# Patient Record
Sex: FEMALE | Race: WHITE
[De-identification: names, ages, dates, MRNs, and addresses within clinical notes are randomized per-mention and may not be internally consistent; named-entity substitution may affect disease eponyms.]

---

## 2018-06-05 ENCOUNTER — HOSPITAL ENCOUNTER (EMERGENCY)
Dept: HOSPITAL 84 - D.ER | Age: 53
Discharge: HOME | End: 2018-06-05
Payer: MEDICARE

## 2018-06-05 VITALS — DIASTOLIC BLOOD PRESSURE: 54 MMHG | SYSTOLIC BLOOD PRESSURE: 114 MMHG

## 2018-06-05 VITALS — WEIGHT: 230.48 LBS | HEIGHT: 60 IN | BODY MASS INDEX: 45.25 KG/M2

## 2018-06-05 DIAGNOSIS — J44.9: ICD-10-CM

## 2018-06-05 DIAGNOSIS — M25.50: ICD-10-CM

## 2018-06-05 DIAGNOSIS — F17.200: ICD-10-CM

## 2018-06-05 DIAGNOSIS — G89.4: Primary | ICD-10-CM

## 2018-07-25 ENCOUNTER — HOSPITAL ENCOUNTER (EMERGENCY)
Dept: HOSPITAL 84 - D.ER | Age: 53
Discharge: HOME | End: 2018-07-25
Payer: MEDICARE

## 2018-07-25 VITALS
WEIGHT: 220.46 LBS | HEIGHT: 60 IN | HEIGHT: 60 IN | BODY MASS INDEX: 43.28 KG/M2 | BODY MASS INDEX: 43.28 KG/M2 | WEIGHT: 220.46 LBS | WEIGHT: 220.46 LBS | BODY MASS INDEX: 43.28 KG/M2 | HEIGHT: 60 IN

## 2018-07-25 VITALS — SYSTOLIC BLOOD PRESSURE: 111 MMHG | DIASTOLIC BLOOD PRESSURE: 56 MMHG

## 2018-07-25 DIAGNOSIS — M79.1: Primary | ICD-10-CM

## 2018-07-25 DIAGNOSIS — F17.200: ICD-10-CM

## 2018-07-25 DIAGNOSIS — J44.9: ICD-10-CM

## 2018-07-25 DIAGNOSIS — Z86.59: ICD-10-CM

## 2018-09-09 ENCOUNTER — HOSPITAL ENCOUNTER (INPATIENT)
Dept: HOSPITAL 84 - D.ER | Age: 53
LOS: 2 days | Discharge: ANOTHER HOSPITAL | DRG: 918 | End: 2018-09-11
Attending: FAMILY MEDICINE | Admitting: FAMILY MEDICINE
Payer: MEDICARE

## 2018-09-09 VITALS — DIASTOLIC BLOOD PRESSURE: 76 MMHG | SYSTOLIC BLOOD PRESSURE: 125 MMHG

## 2018-09-09 VITALS — DIASTOLIC BLOOD PRESSURE: 68 MMHG | SYSTOLIC BLOOD PRESSURE: 132 MMHG

## 2018-09-09 VITALS
HEIGHT: 60 IN | WEIGHT: 222 LBS | HEIGHT: 60 IN | BODY MASS INDEX: 43.59 KG/M2 | WEIGHT: 222 LBS | BODY MASS INDEX: 43.59 KG/M2 | BODY MASS INDEX: 43.59 KG/M2 | BODY MASS INDEX: 43.59 KG/M2

## 2018-09-09 VITALS — SYSTOLIC BLOOD PRESSURE: 133 MMHG | DIASTOLIC BLOOD PRESSURE: 69 MMHG

## 2018-09-09 VITALS — DIASTOLIC BLOOD PRESSURE: 61 MMHG | SYSTOLIC BLOOD PRESSURE: 129 MMHG

## 2018-09-09 VITALS — DIASTOLIC BLOOD PRESSURE: 81 MMHG | SYSTOLIC BLOOD PRESSURE: 126 MMHG

## 2018-09-09 VITALS — SYSTOLIC BLOOD PRESSURE: 111 MMHG | DIASTOLIC BLOOD PRESSURE: 69 MMHG

## 2018-09-09 VITALS — SYSTOLIC BLOOD PRESSURE: 136 MMHG | DIASTOLIC BLOOD PRESSURE: 103 MMHG

## 2018-09-09 VITALS — DIASTOLIC BLOOD PRESSURE: 100 MMHG | SYSTOLIC BLOOD PRESSURE: 146 MMHG

## 2018-09-09 VITALS — DIASTOLIC BLOOD PRESSURE: 85 MMHG | SYSTOLIC BLOOD PRESSURE: 127 MMHG

## 2018-09-09 DIAGNOSIS — T43.212A: Primary | ICD-10-CM

## 2018-09-09 DIAGNOSIS — F15.10: ICD-10-CM

## 2018-09-09 DIAGNOSIS — F31.9: ICD-10-CM

## 2018-09-09 DIAGNOSIS — F17.203: ICD-10-CM

## 2018-09-09 DIAGNOSIS — F12.10: ICD-10-CM

## 2018-09-09 DIAGNOSIS — E66.01: ICD-10-CM

## 2018-09-09 DIAGNOSIS — J44.9: ICD-10-CM

## 2018-09-09 LAB
ALBUMIN SERPL-MCNC: 2.5 G/DL (ref 3.4–5)
ALP SERPL-CCNC: 130 U/L (ref 46–116)
ALT SERPL-CCNC: 14 U/L (ref 10–68)
AMPHETAMINES UR QL SCN: POSITIVE QUAL
ANION GAP SERPL CALC-SCNC: 15.3 MMOL/L (ref 8–16)
APAP SERPL-MCNC: 2.9 UG/ML (ref 10–30)
APPEARANCE UR: CLEAR
BARBITURATES UR QL SCN: NEGATIVE QUAL
BASOPHILS NFR BLD AUTO: 0.1 % (ref 0–2)
BENZODIAZ UR QL SCN: NEGATIVE QUAL
BILIRUB SERPL-MCNC: 0.29 MG/DL (ref 0.2–1.3)
BILIRUB SERPL-MCNC: NEGATIVE MG/DL
BUN SERPL-MCNC: 9 MG/DL (ref 7–18)
BZE UR QL SCN: NEGATIVE QUAL
CALCIUM SERPL-MCNC: 8.7 MG/DL (ref 8.5–10.1)
CANNABINOIDS UR QL SCN: POSITIVE QUAL
CHLORIDE SERPL-SCNC: 98 MMOL/L (ref 98–107)
CO2 SERPL-SCNC: 28 MMOL/L (ref 21–32)
COLOR UR: YELLOW
CREAT SERPL-MCNC: 1.1 MG/DL (ref 0.6–1.3)
EOSINOPHIL NFR BLD: 0.5 % (ref 0–7)
ERYTHROCYTE [DISTWIDTH] IN BLOOD BY AUTOMATED COUNT: 16 % (ref 11.5–14.5)
ETHANOL SERPL-MCNC: 3 MG/DL (ref 0–10)
GLOBULIN SER-MCNC: 4.6 G/L
GLUCOSE SERPL-MCNC: 120 MG/DL (ref 74–106)
GLUCOSE SERPL-MCNC: NEGATIVE MG/DL
HCT VFR BLD CALC: 45.3 % (ref 36–48)
HGB BLD-MCNC: 14.8 G/DL (ref 12–16)
IMM GRANULOCYTES NFR BLD: 0.3 % (ref 0–5)
KETONES UR STRIP-MCNC: NEGATIVE MG/DL
LYMPHOCYTES NFR BLD AUTO: 26 % (ref 15–50)
MCH RBC QN AUTO: 29 PG (ref 26–34)
MCHC RBC AUTO-ENTMCNC: 32.7 G/DL (ref 31–37)
MCV RBC: 88.6 FL (ref 80–100)
MONOCYTES NFR BLD: 7 % (ref 2–11)
NEUTROPHILS NFR BLD AUTO: 66.1 % (ref 40–80)
NITRITE UR-MCNC: NEGATIVE MG/ML
OPIATES UR QL SCN: POSITIVE QUAL
OSMOLALITY SERPL CALC.SUM OF ELEC: 275 MOSM/KG (ref 275–300)
PCP UR QL SCN: NEGATIVE QUAL
PH UR STRIP: 6 [PH] (ref 5–6)
PLATELET # BLD: 446 10X3/UL (ref 130–400)
PMV BLD AUTO: 9.5 FL (ref 7.4–10.4)
POTASSIUM SERPL-SCNC: 3.3 MMOL/L (ref 3.5–5.1)
PROT SERPL-MCNC: 7.1 G/DL (ref 6.4–8.2)
PROT UR-MCNC: NEGATIVE MG/DL
RBC # BLD AUTO: 5.11 10X6/UL (ref 4–5.4)
SODIUM SERPL-SCNC: 138 MMOL/L (ref 136–145)
SP GR UR STRIP: 1.01 (ref 1–1.02)
UROBILINOGEN UR-MCNC: NORMAL MG/DL
WBC # BLD AUTO: 14.6 10X3/UL (ref 4.8–10.8)

## 2018-09-10 VITALS — DIASTOLIC BLOOD PRESSURE: 89 MMHG | SYSTOLIC BLOOD PRESSURE: 123 MMHG

## 2018-09-10 VITALS — DIASTOLIC BLOOD PRESSURE: 84 MMHG | SYSTOLIC BLOOD PRESSURE: 126 MMHG

## 2018-09-10 VITALS — DIASTOLIC BLOOD PRESSURE: 89 MMHG | SYSTOLIC BLOOD PRESSURE: 112 MMHG

## 2018-09-10 VITALS — DIASTOLIC BLOOD PRESSURE: 78 MMHG | SYSTOLIC BLOOD PRESSURE: 108 MMHG

## 2018-09-10 VITALS — DIASTOLIC BLOOD PRESSURE: 86 MMHG | SYSTOLIC BLOOD PRESSURE: 122 MMHG

## 2018-09-10 VITALS — DIASTOLIC BLOOD PRESSURE: 71 MMHG | SYSTOLIC BLOOD PRESSURE: 124 MMHG

## 2018-09-10 VITALS — DIASTOLIC BLOOD PRESSURE: 79 MMHG | SYSTOLIC BLOOD PRESSURE: 119 MMHG

## 2018-09-10 VITALS — DIASTOLIC BLOOD PRESSURE: 76 MMHG | SYSTOLIC BLOOD PRESSURE: 112 MMHG

## 2018-09-10 VITALS — SYSTOLIC BLOOD PRESSURE: 123 MMHG | DIASTOLIC BLOOD PRESSURE: 87 MMHG

## 2018-09-10 VITALS — DIASTOLIC BLOOD PRESSURE: 80 MMHG | SYSTOLIC BLOOD PRESSURE: 119 MMHG

## 2018-09-10 VITALS — DIASTOLIC BLOOD PRESSURE: 85 MMHG | SYSTOLIC BLOOD PRESSURE: 120 MMHG

## 2018-09-10 VITALS — SYSTOLIC BLOOD PRESSURE: 109 MMHG | DIASTOLIC BLOOD PRESSURE: 72 MMHG

## 2018-09-10 VITALS — SYSTOLIC BLOOD PRESSURE: 115 MMHG | DIASTOLIC BLOOD PRESSURE: 76 MMHG

## 2018-09-10 VITALS — DIASTOLIC BLOOD PRESSURE: 90 MMHG | SYSTOLIC BLOOD PRESSURE: 99 MMHG

## 2018-09-10 VITALS — SYSTOLIC BLOOD PRESSURE: 107 MMHG | DIASTOLIC BLOOD PRESSURE: 80 MMHG

## 2018-09-10 VITALS — SYSTOLIC BLOOD PRESSURE: 101 MMHG | DIASTOLIC BLOOD PRESSURE: 75 MMHG

## 2018-09-10 VITALS — DIASTOLIC BLOOD PRESSURE: 79 MMHG | SYSTOLIC BLOOD PRESSURE: 112 MMHG

## 2018-09-10 VITALS — DIASTOLIC BLOOD PRESSURE: 73 MMHG | SYSTOLIC BLOOD PRESSURE: 105 MMHG

## 2018-09-10 VITALS — SYSTOLIC BLOOD PRESSURE: 111 MMHG | DIASTOLIC BLOOD PRESSURE: 75 MMHG

## 2018-09-10 LAB
ALBUMIN SERPL-MCNC: 2.1 G/DL (ref 3.4–5)
ALP SERPL-CCNC: 122 U/L (ref 46–116)
ALT SERPL-CCNC: 13 U/L (ref 10–68)
ANION GAP SERPL CALC-SCNC: 6.6 MMOL/L (ref 8–16)
BASOPHILS NFR BLD AUTO: 0.1 % (ref 0–2)
BILIRUB SERPL-MCNC: 0.23 MG/DL (ref 0.2–1.3)
BUN SERPL-MCNC: 10 MG/DL (ref 7–18)
CALCIUM SERPL-MCNC: 8 MG/DL (ref 8.5–10.1)
CHLORIDE SERPL-SCNC: 101 MMOL/L (ref 98–107)
CO2 SERPL-SCNC: 32.6 MMOL/L (ref 21–32)
CREAT SERPL-MCNC: 0.9 MG/DL (ref 0.6–1.3)
EOSINOPHIL NFR BLD: 0.5 % (ref 0–7)
ERYTHROCYTE [DISTWIDTH] IN BLOOD BY AUTOMATED COUNT: 15.8 % (ref 11.5–14.5)
GLOBULIN SER-MCNC: 4 G/L
GLUCOSE SERPL-MCNC: 97 MG/DL (ref 74–106)
HCT VFR BLD CALC: 42.9 % (ref 36–48)
HGB BLD-MCNC: 13.7 G/DL (ref 12–16)
IMM GRANULOCYTES NFR BLD: 0.3 % (ref 0–5)
LYMPHOCYTES NFR BLD AUTO: 21.6 % (ref 15–50)
MCH RBC QN AUTO: 28.4 PG (ref 26–34)
MCHC RBC AUTO-ENTMCNC: 31.9 G/DL (ref 31–37)
MCV RBC: 89 FL (ref 80–100)
MONOCYTES NFR BLD: 5 % (ref 2–11)
NEUTROPHILS NFR BLD AUTO: 72.5 % (ref 40–80)
OSMOLALITY SERPL CALC.SUM OF ELEC: 272 MOSM/KG (ref 275–300)
PLATELET # BLD: 367 10X3/UL (ref 130–400)
PMV BLD AUTO: 9.4 FL (ref 7.4–10.4)
POTASSIUM SERPL-SCNC: 3.2 MMOL/L (ref 3.5–5.1)
PROT SERPL-MCNC: 6.1 G/DL (ref 6.4–8.2)
RBC # BLD AUTO: 4.82 10X6/UL (ref 4–5.4)
SODIUM SERPL-SCNC: 137 MMOL/L (ref 136–145)
WBC # BLD AUTO: 13.1 10X3/UL (ref 4.8–10.8)

## 2019-10-18 ENCOUNTER — HOSPITAL ENCOUNTER (INPATIENT)
Dept: HOSPITAL 84 - D.ER | Age: 54
LOS: 6 days | Discharge: HOME | DRG: 208 | End: 2019-10-24
Attending: FAMILY MEDICINE | Admitting: FAMILY MEDICINE
Payer: MEDICARE

## 2019-10-18 VITALS
BODY MASS INDEX: 51.15 KG/M2 | BODY MASS INDEX: 51.15 KG/M2 | HEIGHT: 60 IN | HEIGHT: 60 IN | BODY MASS INDEX: 51.15 KG/M2 | BODY MASS INDEX: 51.15 KG/M2 | WEIGHT: 260.55 LBS | WEIGHT: 260.55 LBS

## 2019-10-18 VITALS — SYSTOLIC BLOOD PRESSURE: 114 MMHG | DIASTOLIC BLOOD PRESSURE: 54 MMHG

## 2019-10-18 VITALS — SYSTOLIC BLOOD PRESSURE: 125 MMHG | DIASTOLIC BLOOD PRESSURE: 56 MMHG

## 2019-10-18 VITALS — SYSTOLIC BLOOD PRESSURE: 112 MMHG | DIASTOLIC BLOOD PRESSURE: 64 MMHG

## 2019-10-18 VITALS — DIASTOLIC BLOOD PRESSURE: 73 MMHG | SYSTOLIC BLOOD PRESSURE: 139 MMHG

## 2019-10-18 VITALS — SYSTOLIC BLOOD PRESSURE: 129 MMHG | DIASTOLIC BLOOD PRESSURE: 80 MMHG

## 2019-10-18 VITALS — SYSTOLIC BLOOD PRESSURE: 141 MMHG | DIASTOLIC BLOOD PRESSURE: 81 MMHG

## 2019-10-18 DIAGNOSIS — F17.203: ICD-10-CM

## 2019-10-18 DIAGNOSIS — E66.2: ICD-10-CM

## 2019-10-18 DIAGNOSIS — J44.9: ICD-10-CM

## 2019-10-18 DIAGNOSIS — J69.0: Primary | ICD-10-CM

## 2019-10-18 DIAGNOSIS — D53.9: ICD-10-CM

## 2019-10-18 DIAGNOSIS — J96.22: ICD-10-CM

## 2019-10-18 DIAGNOSIS — B37.3: ICD-10-CM

## 2019-10-18 DIAGNOSIS — E87.1: ICD-10-CM

## 2019-10-18 DIAGNOSIS — J96.21: ICD-10-CM

## 2019-10-18 DIAGNOSIS — F41.9: ICD-10-CM

## 2019-10-18 DIAGNOSIS — G93.41: ICD-10-CM

## 2019-10-18 DIAGNOSIS — E87.2: ICD-10-CM

## 2019-10-18 LAB
ALBUMIN SERPL-MCNC: 3.1 G/DL (ref 3.4–5)
ALP SERPL-CCNC: 124 U/L (ref 46–116)
ALT SERPL-CCNC: 24 U/L (ref 10–68)
AMPHETAMINES UR QL SCN: NEGATIVE QUAL
ANION GAP SERPL CALC-SCNC: 2.1 MMOL/L (ref 8–16)
APPEARANCE UR: CLEAR
APTT BLD: 28.6 SECONDS (ref 22.8–39.4)
BARBITURATES UR QL SCN: NEGATIVE QUAL
BASOPHILS NFR BLD AUTO: 0.2 % (ref 0–2)
BENZODIAZ UR QL SCN: NEGATIVE QUAL
BILIRUB SERPL-MCNC: 0.17 MG/DL (ref 0.2–1.3)
BILIRUB SERPL-MCNC: NEGATIVE MG/DL
BUN SERPL-MCNC: 11 MG/DL (ref 7–18)
BZE UR QL SCN: NEGATIVE QUAL
CALCIUM SERPL-MCNC: 8.7 MG/DL (ref 8.5–10.1)
CANNABINOIDS UR QL SCN: NEGATIVE QUAL
CHLORIDE SERPL-SCNC: 95 MMOL/L (ref 98–107)
CK MB SERPL-MCNC: 1.9 U/L (ref 0–3.6)
CK SERPL-CCNC: 27 UL (ref 21–215)
CO2 SERPL-SCNC: 41.2 MMOL/L (ref 21–32)
COLOR UR: YELLOW
CREAT SERPL-MCNC: 0.9 MG/DL (ref 0.6–1.3)
EOSINOPHIL NFR BLD: 0.3 % (ref 0–7)
ERYTHROCYTE [DISTWIDTH] IN BLOOD BY AUTOMATED COUNT: 14.7 % (ref 11.5–14.5)
GLOBULIN SER-MCNC: 3.7 G/L
GLUCOSE SERPL-MCNC: 150 MG/DL (ref 74–106)
GLUCOSE SERPL-MCNC: NEGATIVE MG/DL
HCT VFR BLD CALC: 36.9 % (ref 36–48)
HGB BLD-MCNC: 10.6 G/DL (ref 12–16)
IMM GRANULOCYTES NFR BLD: 0.7 % (ref 0–5)
INR PPP: 0.95 (ref 0.85–1.17)
KETONES UR STRIP-MCNC: NEGATIVE MG/DL
LYMPHOCYTES NFR BLD AUTO: 24.1 % (ref 15–50)
MAGNESIUM SERPL-MCNC: 1.8 MG/DL (ref 1.8–2.4)
MCH RBC QN AUTO: 30 PG (ref 26–34)
MCHC RBC AUTO-ENTMCNC: 28.7 G/DL (ref 31–37)
MCV RBC: 104.5 FL (ref 80–100)
MONOCYTES NFR BLD: 8.4 % (ref 2–11)
NEUTROPHILS NFR BLD AUTO: 66.3 % (ref 40–80)
NITRITE UR-MCNC: NEGATIVE MG/ML
OPIATES UR QL SCN: POSITIVE QUAL
OSMOLALITY SERPL CALC.SUM OF ELEC: 267 MOSM/KG (ref 275–300)
PCP UR QL SCN: NEGATIVE QUAL
PH UR STRIP: 5 [PH] (ref 5–6)
PLATELET # BLD: 272 10X3/UL (ref 130–400)
PMV BLD AUTO: 10.5 FL (ref 7.4–10.4)
POTASSIUM SERPL-SCNC: 5.3 MMOL/L (ref 3.5–5.1)
PROT SERPL-MCNC: 6.8 G/DL (ref 6.4–8.2)
PROT UR-MCNC: NEGATIVE MG/DL
PROTHROMBIN TIME: 12.2 SECONDS (ref 11.6–15)
RBC # BLD AUTO: 3.53 10X6/UL (ref 4–5.4)
SODIUM SERPL-SCNC: 133 MMOL/L (ref 136–145)
SP GR UR STRIP: 1.01 (ref 1–1.02)
TROPONIN I SERPL-MCNC: 0.02 NG/ML (ref 0–0.06)
TSH SERPL-ACNC: 1.38 UIU/ML (ref 0.36–3.74)
UROBILINOGEN UR-MCNC: NORMAL MG/DL
WBC # BLD AUTO: 11.4 10X3/UL (ref 4.8–10.8)

## 2019-10-18 NOTE — NUR
PT ARRIVED ON UNIT VIA STRETCHER ACCOMPANIED BY ER STAFF, NO O2 ON PATIENT
THIS TIME, RESPIRATORY AT BEDSIDE, PATIENT TRANSFERRED AND BIPAP PLACED.
PATIENT AWAKE, CONFUSED ATTEMPTING TO PULL BIPAP OFF, WANTS TO GO HOME,
ATTEMPTS TO REORIENT UNSUCCESSFUL. VSS CPOC

## 2019-10-18 NOTE — NUR
PT PULLING BIPAP AND ICU MONITORING OFF AT THIS TIME, CONFUSED, UNABLE TO
REORIENT, UNDERSTANDS SHE IS AT THE HOSPTIAL BUT SHE WANTS TO GO HOME AND SHE
CONTINUES TO PULL EVERYTHING OFF. SOFT BILAT WRIST RESTRAINTS APPLIED AT THIS
TIME FOR MEDICAL SAFETY.

## 2019-10-18 NOTE — NUR
SHIFT ASSESSMENT COMPLETED, BIPAP ALARMING, PATIENT LETHARGIC RESPONDS TO
VOICE, UNABLE TO ANSWER QUESTIONS, POOR HISTORIAN, WAKES UP AND IMMEDIATELY
GOES BACK TO SLEEP.

## 2019-10-18 NOTE — NUR
CALL LIGHT ANSWERED BY THIS NURSE. MISTY ESTRADA AT BEDSIDE, VERBAL ORDERS FOR
ABG'S AND BI-PAP RECEIVED AT THIS TIME. RESPIRATORY CALLED.

## 2019-10-18 NOTE — NUR
WENT INTO ROOM AND PT TRYING TO OPEN A BOTTLE OF PRESCRIPTION MED TO TAKE.
REFUSED TO GIVE UP MEDICATION BOTTLE. THIS NURSE CALLED OTHER NURSES TO HELP.

## 2019-10-18 NOTE — NUR
REPORT RECEIVED FROM EMMY JOYCE. PT REQUESTS TO URINATE. BEDPAN PLACED UNDER PT,
CALL LIGHT LEFT FOR PT TO CALL WHEN FINISHED.

## 2019-10-19 VITALS — DIASTOLIC BLOOD PRESSURE: 65 MMHG | SYSTOLIC BLOOD PRESSURE: 128 MMHG

## 2019-10-19 VITALS — DIASTOLIC BLOOD PRESSURE: 66 MMHG | SYSTOLIC BLOOD PRESSURE: 125 MMHG

## 2019-10-19 VITALS — SYSTOLIC BLOOD PRESSURE: 125 MMHG | DIASTOLIC BLOOD PRESSURE: 69 MMHG

## 2019-10-19 VITALS — SYSTOLIC BLOOD PRESSURE: 128 MMHG | DIASTOLIC BLOOD PRESSURE: 71 MMHG

## 2019-10-19 VITALS — DIASTOLIC BLOOD PRESSURE: 77 MMHG | SYSTOLIC BLOOD PRESSURE: 129 MMHG

## 2019-10-19 VITALS — SYSTOLIC BLOOD PRESSURE: 98 MMHG | DIASTOLIC BLOOD PRESSURE: 45 MMHG

## 2019-10-19 VITALS — DIASTOLIC BLOOD PRESSURE: 71 MMHG | SYSTOLIC BLOOD PRESSURE: 112 MMHG

## 2019-10-19 VITALS — DIASTOLIC BLOOD PRESSURE: 50 MMHG | SYSTOLIC BLOOD PRESSURE: 99 MMHG

## 2019-10-19 VITALS — DIASTOLIC BLOOD PRESSURE: 86 MMHG | SYSTOLIC BLOOD PRESSURE: 142 MMHG

## 2019-10-19 VITALS — SYSTOLIC BLOOD PRESSURE: 127 MMHG | DIASTOLIC BLOOD PRESSURE: 77 MMHG

## 2019-10-19 VITALS — DIASTOLIC BLOOD PRESSURE: 68 MMHG | SYSTOLIC BLOOD PRESSURE: 130 MMHG

## 2019-10-19 VITALS — SYSTOLIC BLOOD PRESSURE: 95 MMHG | DIASTOLIC BLOOD PRESSURE: 49 MMHG

## 2019-10-19 VITALS — DIASTOLIC BLOOD PRESSURE: 64 MMHG | SYSTOLIC BLOOD PRESSURE: 119 MMHG

## 2019-10-19 VITALS — SYSTOLIC BLOOD PRESSURE: 119 MMHG | DIASTOLIC BLOOD PRESSURE: 71 MMHG

## 2019-10-19 VITALS — DIASTOLIC BLOOD PRESSURE: 53 MMHG | SYSTOLIC BLOOD PRESSURE: 99 MMHG

## 2019-10-19 VITALS — DIASTOLIC BLOOD PRESSURE: 62 MMHG | SYSTOLIC BLOOD PRESSURE: 111 MMHG

## 2019-10-19 VITALS — DIASTOLIC BLOOD PRESSURE: 70 MMHG | SYSTOLIC BLOOD PRESSURE: 119 MMHG

## 2019-10-19 VITALS — SYSTOLIC BLOOD PRESSURE: 99 MMHG | DIASTOLIC BLOOD PRESSURE: 46 MMHG

## 2019-10-19 VITALS — SYSTOLIC BLOOD PRESSURE: 129 MMHG | DIASTOLIC BLOOD PRESSURE: 97 MMHG

## 2019-10-19 VITALS — SYSTOLIC BLOOD PRESSURE: 89 MMHG | DIASTOLIC BLOOD PRESSURE: 42 MMHG

## 2019-10-19 VITALS — DIASTOLIC BLOOD PRESSURE: 53 MMHG | SYSTOLIC BLOOD PRESSURE: 104 MMHG

## 2019-10-19 VITALS — DIASTOLIC BLOOD PRESSURE: 66 MMHG | SYSTOLIC BLOOD PRESSURE: 119 MMHG

## 2019-10-19 VITALS — SYSTOLIC BLOOD PRESSURE: 122 MMHG | DIASTOLIC BLOOD PRESSURE: 66 MMHG

## 2019-10-19 VITALS — SYSTOLIC BLOOD PRESSURE: 101 MMHG | DIASTOLIC BLOOD PRESSURE: 53 MMHG

## 2019-10-19 VITALS — SYSTOLIC BLOOD PRESSURE: 132 MMHG | DIASTOLIC BLOOD PRESSURE: 76 MMHG

## 2019-10-19 VITALS — SYSTOLIC BLOOD PRESSURE: 115 MMHG | DIASTOLIC BLOOD PRESSURE: 72 MMHG

## 2019-10-19 LAB
ANION GAP SERPL CALC-SCNC: 3.2 MMOL/L (ref 8–16)
BASOPHILS NFR BLD AUTO: 0.1 % (ref 0–2)
BUN SERPL-MCNC: 12 MG/DL (ref 7–18)
CALCIUM SERPL-MCNC: 8.6 MG/DL (ref 8.5–10.1)
CHLORIDE SERPL-SCNC: 98 MMOL/L (ref 98–107)
CO2 SERPL-SCNC: 38 MMOL/L (ref 21–32)
CREAT SERPL-MCNC: 0.8 MG/DL (ref 0.6–1.3)
EOSINOPHIL NFR BLD: 0.4 % (ref 0–7)
ERYTHROCYTE [DISTWIDTH] IN BLOOD BY AUTOMATED COUNT: 14.8 % (ref 11.5–14.5)
GLUCOSE SERPL-MCNC: 112 MG/DL (ref 74–106)
HCT VFR BLD CALC: 33 % (ref 36–48)
HGB BLD-MCNC: 9.5 G/DL (ref 12–16)
IMM GRANULOCYTES NFR BLD: 0.3 % (ref 0–5)
LYMPHOCYTES NFR BLD AUTO: 33.2 % (ref 15–50)
MAGNESIUM SERPL-MCNC: 1.6 MG/DL (ref 1.8–2.4)
MCH RBC QN AUTO: 30 PG (ref 26–34)
MCHC RBC AUTO-ENTMCNC: 28.8 G/DL (ref 31–37)
MCV RBC: 104.1 FL (ref 80–100)
MONOCYTES NFR BLD: 10.9 % (ref 2–11)
NEUTROPHILS NFR BLD AUTO: 55.1 % (ref 40–80)
NT-PROBNP SERPL-MCNC: 1703 PG/ML (ref 0–125)
OSMOLALITY SERPL CALC.SUM OF ELEC: 270 MOSM/KG (ref 275–300)
PHOSPHATE SERPL-MCNC: 1.6 MG/DL (ref 2.5–4.9)
PLATELET # BLD: 220 10X3/UL (ref 130–400)
PMV BLD AUTO: 10.6 FL (ref 7.4–10.4)
POTASSIUM SERPL-SCNC: 4.2 MMOL/L (ref 3.5–5.1)
RBC # BLD AUTO: 3.17 10X6/UL (ref 4–5.4)
SODIUM SERPL-SCNC: 135 MMOL/L (ref 136–145)
WBC # BLD AUTO: 12.1 10X3/UL (ref 4.8–10.8)

## 2019-10-19 PROCEDURE — 5A1945Z RESPIRATORY VENTILATION, 24-96 CONSECUTIVE HOURS: ICD-10-PCS | Performed by: FAMILY MEDICINE

## 2019-10-19 PROCEDURE — 0BH17EZ INSERTION OF ENDOTRACHEAL AIRWAY INTO TRACHEA, VIA NATURAL OR ARTIFICIAL OPENING: ICD-10-PCS | Performed by: FAMILY MEDICINE

## 2019-10-19 NOTE — NUR
TUBE FEEDINGS STARTED AT THIS TIME PER PHYSICAIN ORDERS. PULMOCARE AT 25ML/HR.
NO ACUTE DISTRESS NOTED. VSS. WILL CONTINUE PLAN OF CARE.

## 2019-10-19 NOTE — NUR
NO ACUTE DISTRESS NOTED. NO CHANGE. VSS. TURNED Q2H. ORAL CARE PROVIDED Q2H.
WILL CONTINUE PLAN OF CARE.

## 2019-10-19 NOTE — NUR
UP IN BED VISITING WITH FAMILY AT THIS TIME. NO ACUTE DISTRESS NOTED. VSS.
WILL CONTINUE PLAN OF CARE.

## 2019-10-19 NOTE — NUR
PT REASSESSMENT COMPLETED SEE FLOWSHEET, PT INTUBATED AND SEDATED NO APPARENT
SIGNS OF DISTRESS AT THIS TIME VSS CPOC

## 2019-10-19 NOTE — NUR
TUBE FEEDING ORDERS RECIVED BY DR QUIÑONES, ORDER RECIEVED FOR OGT PLACEMENT. OGT
PLACED 16F, PLACEMENT VERIFIED VIA AUSCULTATION. ALSO PER DR APODACA, ORDER
ELECTROLYTE PROTOCOL FOR LOW MAGNESIUM AND PHOSPHATE. NO ACUTE DISTRESS NOTED.
VSS. WILL CONTINUE PLAN OF CARE.

## 2019-10-19 NOTE — NUR
NO ACUTE DISTRESS NOTED. VSS. PT NOTED TO OPEN EYES AND FOLLOW COMMANDS SUCH
AS SQUEEZE HANDS AT TIMES. PT TURNED Q2H. ORAL CARE PROVIDED Q2H. WILL
CONTINUE PLAN OF CARE.

## 2019-10-19 NOTE — NUR
LYING IN BED ON VENT AT THIS TIME. VSS. PT NOTED TO BECOME AGGITATED AT TIME
TO STIMULATION: PT FACE TURNS RED AND PT BEGINS TO FLEX EXTREMITIES. PT
RECIEVES PRN VERSED FOR SEDATION, AFTER RECIEVED PRN VERSED, PT RELAXED AND NO
LONGER APPEARED AGGITATED. PT NOT CURRENTLY FOLLOWING COMMANDS. TURNED Q2H.
ORAL CARE PROVIDED Q2H. WILL CONTINUE PLAN OF CARE.

## 2019-10-19 NOTE — NUR
REPORT RECEIVED INITIAL ASSESSMENT. PT SEDATED WITH DIPRIVAN DOES OPEN EYES TO
VERBAL STIMULI. ORALLY INTUBATED VENT SIMV. CM READING SR WITH ALARMS ON AND
AUDIBLE. BED LOW POSITION CL IN REACH CPOC WILL CONTINUE TO MONITOR

## 2019-10-20 VITALS — SYSTOLIC BLOOD PRESSURE: 122 MMHG | DIASTOLIC BLOOD PRESSURE: 59 MMHG

## 2019-10-20 VITALS — SYSTOLIC BLOOD PRESSURE: 116 MMHG | DIASTOLIC BLOOD PRESSURE: 79 MMHG

## 2019-10-20 VITALS — DIASTOLIC BLOOD PRESSURE: 62 MMHG | SYSTOLIC BLOOD PRESSURE: 104 MMHG

## 2019-10-20 VITALS — SYSTOLIC BLOOD PRESSURE: 123 MMHG | DIASTOLIC BLOOD PRESSURE: 82 MMHG

## 2019-10-20 VITALS — DIASTOLIC BLOOD PRESSURE: 78 MMHG | SYSTOLIC BLOOD PRESSURE: 134 MMHG

## 2019-10-20 VITALS — DIASTOLIC BLOOD PRESSURE: 73 MMHG | SYSTOLIC BLOOD PRESSURE: 114 MMHG

## 2019-10-20 VITALS — DIASTOLIC BLOOD PRESSURE: 56 MMHG | SYSTOLIC BLOOD PRESSURE: 99 MMHG

## 2019-10-20 VITALS — DIASTOLIC BLOOD PRESSURE: 86 MMHG | SYSTOLIC BLOOD PRESSURE: 162 MMHG

## 2019-10-20 VITALS — SYSTOLIC BLOOD PRESSURE: 129 MMHG | DIASTOLIC BLOOD PRESSURE: 59 MMHG

## 2019-10-20 VITALS — DIASTOLIC BLOOD PRESSURE: 71 MMHG | SYSTOLIC BLOOD PRESSURE: 121 MMHG

## 2019-10-20 VITALS — DIASTOLIC BLOOD PRESSURE: 89 MMHG | SYSTOLIC BLOOD PRESSURE: 126 MMHG

## 2019-10-20 VITALS — SYSTOLIC BLOOD PRESSURE: 127 MMHG | DIASTOLIC BLOOD PRESSURE: 71 MMHG

## 2019-10-20 VITALS — DIASTOLIC BLOOD PRESSURE: 62 MMHG | SYSTOLIC BLOOD PRESSURE: 125 MMHG

## 2019-10-20 VITALS — DIASTOLIC BLOOD PRESSURE: 72 MMHG | SYSTOLIC BLOOD PRESSURE: 135 MMHG

## 2019-10-20 VITALS — DIASTOLIC BLOOD PRESSURE: 61 MMHG | SYSTOLIC BLOOD PRESSURE: 119 MMHG

## 2019-10-20 VITALS — DIASTOLIC BLOOD PRESSURE: 68 MMHG | SYSTOLIC BLOOD PRESSURE: 120 MMHG

## 2019-10-20 VITALS — SYSTOLIC BLOOD PRESSURE: 93 MMHG | DIASTOLIC BLOOD PRESSURE: 47 MMHG

## 2019-10-20 VITALS — DIASTOLIC BLOOD PRESSURE: 70 MMHG | SYSTOLIC BLOOD PRESSURE: 132 MMHG

## 2019-10-20 VITALS — DIASTOLIC BLOOD PRESSURE: 69 MMHG | SYSTOLIC BLOOD PRESSURE: 117 MMHG

## 2019-10-20 VITALS — DIASTOLIC BLOOD PRESSURE: 79 MMHG | SYSTOLIC BLOOD PRESSURE: 124 MMHG

## 2019-10-20 VITALS — DIASTOLIC BLOOD PRESSURE: 52 MMHG | SYSTOLIC BLOOD PRESSURE: 115 MMHG

## 2019-10-20 VITALS — DIASTOLIC BLOOD PRESSURE: 68 MMHG | SYSTOLIC BLOOD PRESSURE: 109 MMHG

## 2019-10-20 VITALS — DIASTOLIC BLOOD PRESSURE: 101 MMHG | SYSTOLIC BLOOD PRESSURE: 121 MMHG

## 2019-10-20 VITALS — DIASTOLIC BLOOD PRESSURE: 83 MMHG | SYSTOLIC BLOOD PRESSURE: 123 MMHG

## 2019-10-20 LAB
ANION GAP SERPL CALC-SCNC: 5.4 MMOL/L (ref 8–16)
BASOPHILS NFR BLD AUTO: 0.1 % (ref 0–2)
BUN SERPL-MCNC: 13 MG/DL (ref 7–18)
CALCIUM SERPL-MCNC: 8.4 MG/DL (ref 8.5–10.1)
CHLORIDE SERPL-SCNC: 102 MMOL/L (ref 98–107)
CO2 SERPL-SCNC: 34 MMOL/L (ref 21–32)
CREAT SERPL-MCNC: 0.8 MG/DL (ref 0.6–1.3)
EOSINOPHIL NFR BLD: 0.9 % (ref 0–7)
ERYTHROCYTE [DISTWIDTH] IN BLOOD BY AUTOMATED COUNT: 14.9 % (ref 11.5–14.5)
GLUCOSE SERPL-MCNC: 112 MG/DL (ref 74–106)
HCT VFR BLD CALC: 34.7 % (ref 36–48)
HGB BLD-MCNC: 10.4 G/DL (ref 12–16)
IMM GRANULOCYTES NFR BLD: 0.4 % (ref 0–5)
LYMPHOCYTES NFR BLD AUTO: 29.1 % (ref 15–50)
MAGNESIUM SERPL-MCNC: 2.3 MG/DL (ref 1.8–2.4)
MCH RBC QN AUTO: 29.6 PG (ref 26–34)
MCHC RBC AUTO-ENTMCNC: 30 G/DL (ref 31–37)
MCV RBC: 98.9 FL (ref 80–100)
MONOCYTES NFR BLD: 8.6 % (ref 2–11)
NEUTROPHILS NFR BLD AUTO: 60.9 % (ref 40–80)
OSMOLALITY SERPL CALC.SUM OF ELEC: 276 MOSM/KG (ref 275–300)
PHOSPHATE SERPL-MCNC: 3.1 MG/DL (ref 2.5–4.9)
PLATELET # BLD: 236 10X3/UL (ref 130–400)
PMV BLD AUTO: 10.7 FL (ref 7.4–10.4)
POTASSIUM SERPL-SCNC: 3.4 MMOL/L (ref 3.5–5.1)
RBC # BLD AUTO: 3.51 10X6/UL (ref 4–5.4)
SODIUM SERPL-SCNC: 138 MMOL/L (ref 136–145)
WBC # BLD AUTO: 8 10X3/UL (ref 4.8–10.8)

## 2019-10-20 NOTE — NUR
PT REPOSITIONED SELF IN BED. REASSESSMENT DONE, NO CHANGES NOTED. VSS, NO
SIGNS OF ACUTE DISTRESS NOTED. CALL LIGHT IN REACH, WILL MONITOR.

## 2019-10-20 NOTE — NUR
BEDSIDE REPORT AND SHIFT ASSESSMENT COMPLETE. VSS, NO SIGNS OF ACUTE DISTRESS
NOTED. HELPED PT OFF THE BEDPAN, LIQUID BM NOTED. REPOSITIONING COMPLETE. PT
REQUESTING TO SEE HER DAUGHTER, I TOLD HER WHEN IT IS VISITING HOURS HER
DAUGHTER CAN COME BACK - SHE WAS FRUSTRATED AT THIS ANSWER. CALL LIGHT IN
REACH, WILL MONITOR.

## 2019-10-20 NOTE — NUR
EXTUBATED AT 1315. PT PLACED ON 2L VIA NC. PT ALERT AND ORIENTED. VSS.
RESTRAINTS ALSO DCD AT THIS TIME. WILL CONTINUE PLAN OF CARE.

## 2019-10-20 NOTE — NUR
PT REQUESTING SANA, I TOLD HER I WILL PASS IT ALONG DURING MORNING REPORT
BUT I WILL NOT CALL MD ABOUT IT THIS LATE. SHE SAID IT HELPS HER SLEEP. SHE
REQUESTED TYLENOL AS WELL, GIVEN PER MAR. DENIES OTHER NEEDS AT THIS TIME,
CALL LIGHT IN REACH. WILL MONITOR.

## 2019-10-20 NOTE — NUR
UP IN BED ON VENT AT THIS TIME. PT EYES OPEN, FOLLOWS COMMANDS. VSS. TURNED
Q2H. ORAL CARE PROVIDED Q2H. WILL CONTINUE PLAN OF CARE.

## 2019-10-20 NOTE — NUR
PT REQUESTED TO RESTART HER TYLENOL #3, CALLED DR ROSAS WHO STATED NO BUT
THAT PT CAN TAKE TYLENOL FOR PAIN/FEVER. ALSO AT THIS TIME CHG BATH GIVEN WITH
TOTAL LINEN CHANGE. LARGE LOOSE BROWN STOOL ALSO NOTED DURING THEN AND SARA
CARE/GROSS CARE PROVIDED. NO ACUTE DISTRESS NOTED. VSS. WILL CONTINUE PLAN OF
CARE.

## 2019-10-21 VITALS — SYSTOLIC BLOOD PRESSURE: 142 MMHG | DIASTOLIC BLOOD PRESSURE: 83 MMHG

## 2019-10-21 VITALS — DIASTOLIC BLOOD PRESSURE: 79 MMHG | SYSTOLIC BLOOD PRESSURE: 148 MMHG

## 2019-10-21 VITALS — DIASTOLIC BLOOD PRESSURE: 80 MMHG | SYSTOLIC BLOOD PRESSURE: 132 MMHG

## 2019-10-21 VITALS — SYSTOLIC BLOOD PRESSURE: 134 MMHG | DIASTOLIC BLOOD PRESSURE: 78 MMHG

## 2019-10-21 VITALS — SYSTOLIC BLOOD PRESSURE: 127 MMHG | DIASTOLIC BLOOD PRESSURE: 77 MMHG

## 2019-10-21 VITALS — DIASTOLIC BLOOD PRESSURE: 72 MMHG | SYSTOLIC BLOOD PRESSURE: 141 MMHG

## 2019-10-21 VITALS — DIASTOLIC BLOOD PRESSURE: 81 MMHG | SYSTOLIC BLOOD PRESSURE: 154 MMHG

## 2019-10-21 VITALS — DIASTOLIC BLOOD PRESSURE: 73 MMHG | SYSTOLIC BLOOD PRESSURE: 131 MMHG

## 2019-10-21 VITALS — SYSTOLIC BLOOD PRESSURE: 134 MMHG | DIASTOLIC BLOOD PRESSURE: 69 MMHG

## 2019-10-21 VITALS — SYSTOLIC BLOOD PRESSURE: 139 MMHG | DIASTOLIC BLOOD PRESSURE: 98 MMHG

## 2019-10-21 VITALS — SYSTOLIC BLOOD PRESSURE: 115 MMHG | DIASTOLIC BLOOD PRESSURE: 81 MMHG

## 2019-10-21 VITALS — SYSTOLIC BLOOD PRESSURE: 147 MMHG | DIASTOLIC BLOOD PRESSURE: 90 MMHG

## 2019-10-21 VITALS — DIASTOLIC BLOOD PRESSURE: 71 MMHG | SYSTOLIC BLOOD PRESSURE: 88 MMHG

## 2019-10-21 VITALS — SYSTOLIC BLOOD PRESSURE: 143 MMHG | DIASTOLIC BLOOD PRESSURE: 84 MMHG

## 2019-10-21 VITALS — DIASTOLIC BLOOD PRESSURE: 83 MMHG | SYSTOLIC BLOOD PRESSURE: 106 MMHG

## 2019-10-21 VITALS — DIASTOLIC BLOOD PRESSURE: 74 MMHG | SYSTOLIC BLOOD PRESSURE: 133 MMHG

## 2019-10-21 VITALS — DIASTOLIC BLOOD PRESSURE: 81 MMHG | SYSTOLIC BLOOD PRESSURE: 129 MMHG

## 2019-10-21 VITALS — SYSTOLIC BLOOD PRESSURE: 140 MMHG | DIASTOLIC BLOOD PRESSURE: 81 MMHG

## 2019-10-21 VITALS — SYSTOLIC BLOOD PRESSURE: 130 MMHG | DIASTOLIC BLOOD PRESSURE: 84 MMHG

## 2019-10-21 VITALS — SYSTOLIC BLOOD PRESSURE: 134 MMHG | DIASTOLIC BLOOD PRESSURE: 68 MMHG

## 2019-10-21 VITALS — SYSTOLIC BLOOD PRESSURE: 149 MMHG | DIASTOLIC BLOOD PRESSURE: 82 MMHG

## 2019-10-21 VITALS — SYSTOLIC BLOOD PRESSURE: 102 MMHG | DIASTOLIC BLOOD PRESSURE: 72 MMHG

## 2019-10-21 VITALS — DIASTOLIC BLOOD PRESSURE: 88 MMHG | SYSTOLIC BLOOD PRESSURE: 115 MMHG

## 2019-10-21 LAB
ANION GAP SERPL CALC-SCNC: 9 MMOL/L (ref 8–16)
BASOPHILS NFR BLD AUTO: 0.1 % (ref 0–2)
BUN SERPL-MCNC: 12 MG/DL (ref 7–18)
CALCIUM SERPL-MCNC: 8.2 MG/DL (ref 8.5–10.1)
CHLORIDE SERPL-SCNC: 104 MMOL/L (ref 98–107)
CO2 SERPL-SCNC: 32.2 MMOL/L (ref 21–32)
CREAT SERPL-MCNC: 0.8 MG/DL (ref 0.6–1.3)
EOSINOPHIL NFR BLD: 0.8 % (ref 0–7)
ERYTHROCYTE [DISTWIDTH] IN BLOOD BY AUTOMATED COUNT: 14.8 % (ref 11.5–14.5)
GLUCOSE SERPL-MCNC: 97 MG/DL (ref 74–106)
HCT VFR BLD CALC: 33.6 % (ref 36–48)
HGB BLD-MCNC: 10.2 G/DL (ref 12–16)
IMM GRANULOCYTES NFR BLD: 0.4 % (ref 0–5)
LYMPHOCYTES NFR BLD AUTO: 26 % (ref 15–50)
MAGNESIUM SERPL-MCNC: 2 MG/DL (ref 1.8–2.4)
MCH RBC QN AUTO: 29.7 PG (ref 26–34)
MCHC RBC AUTO-ENTMCNC: 30.4 G/DL (ref 31–37)
MCV RBC: 98 FL (ref 80–100)
MONOCYTES NFR BLD: 8.9 % (ref 2–11)
NEUTROPHILS NFR BLD AUTO: 63.8 % (ref 40–80)
OSMOLALITY SERPL CALC.SUM OF ELEC: 282 MOSM/KG (ref 275–300)
PHOSPHATE SERPL-MCNC: 3.8 MG/DL (ref 2.5–4.9)
PLATELET # BLD: 236 10X3/UL (ref 130–400)
PMV BLD AUTO: 10.2 FL (ref 7.4–10.4)
POTASSIUM SERPL-SCNC: 3.2 MMOL/L (ref 3.5–5.1)
RBC # BLD AUTO: 3.43 10X6/UL (ref 4–5.4)
SODIUM SERPL-SCNC: 142 MMOL/L (ref 136–145)
WBC # BLD AUTO: 8.5 10X3/UL (ref 4.8–10.8)

## 2019-10-21 NOTE — NUR
REC'D REPORT AND RESUMED ARE, AAO, SPEECH GARBLED, DYSPNEA NOTED, TACYPNIC,
OTHER VSS, DENIES PAIN AT THIS TIME, O2 VIA NC AT 2L, SAT 92%, LEFT CHEST PIV
WITH NS AT 50 CC/HR, GROSS TO GRAVITY WITH LEAR YELLOW DRAINAGE TO BAG,
ASSESSMENT COMPLETED PER FLOWSHEET, CALL LIGHT IN REACH, NO NEEDS AT THIS TIME

## 2019-10-21 NOTE — NUR
Nutrition follow-up:
Pt extubated 10/20
Remains NPO; TF discontinued
Labs reviewed
WT: 262#
Recommend starting consistent CHO diet when medically feasible.
RDN following.

## 2019-10-21 NOTE — NUR
REASSESSMENT COMPLETE, SEE FLOWSHEET. CHG BATH AND LINEN CHANGE COMPLETE. VSS,
NO SIGNS OF ACUTE DISTRESS NOTED. CALL LIGHT IN REACH, WILL MONITOR.

## 2019-10-21 NOTE — NUR
BEDSIDE REPORT AND SHIFT ASSESSMENT COMPLETE, SEE FLOWSHEET. RT AT BEDSIDE
ADMINISTERING BREATHING TX. VSS, NO SIGNS OF ACUTE DISTRESS NOTED. L CHEST PIV
WNL, NS @ 50. DENIES NEEDS AT THIS TIME, CALL LIGHT IN REACH. WILL MONITOR.

## 2019-10-21 NOTE — NUR
MEDS GIVEN PER MAR, PT REQUESTING SANA. I TOLD HER THE MD DID NOT ORDER THAT
MEDICATION AND SHE STATED THAT IT IS THE ONLY MEDICATION THAT WILL HELP HER
SLEEP. I TOLD HER I WOULD PASS ALONG THE MESSAGE.

## 2019-10-21 NOTE — NUR
REASSESSMENT COMPLETE, SEE FLOWSHEET. VSS, NO SIGNS OF ACUTE DISTRESS NOTED.
DENIES ANY NEEDS AT THIS TIME, WILL MONITOR.

## 2019-10-22 VITALS — SYSTOLIC BLOOD PRESSURE: 133 MMHG | DIASTOLIC BLOOD PRESSURE: 62 MMHG

## 2019-10-22 VITALS — DIASTOLIC BLOOD PRESSURE: 81 MMHG | SYSTOLIC BLOOD PRESSURE: 121 MMHG

## 2019-10-22 VITALS — DIASTOLIC BLOOD PRESSURE: 48 MMHG | SYSTOLIC BLOOD PRESSURE: 99 MMHG

## 2019-10-22 VITALS — DIASTOLIC BLOOD PRESSURE: 68 MMHG | SYSTOLIC BLOOD PRESSURE: 103 MMHG

## 2019-10-22 VITALS — DIASTOLIC BLOOD PRESSURE: 62 MMHG | SYSTOLIC BLOOD PRESSURE: 130 MMHG

## 2019-10-22 VITALS — DIASTOLIC BLOOD PRESSURE: 91 MMHG | SYSTOLIC BLOOD PRESSURE: 130 MMHG

## 2019-10-22 VITALS — SYSTOLIC BLOOD PRESSURE: 92 MMHG | DIASTOLIC BLOOD PRESSURE: 66 MMHG

## 2019-10-22 VITALS — DIASTOLIC BLOOD PRESSURE: 73 MMHG | SYSTOLIC BLOOD PRESSURE: 136 MMHG

## 2019-10-22 VITALS — SYSTOLIC BLOOD PRESSURE: 111 MMHG | DIASTOLIC BLOOD PRESSURE: 72 MMHG

## 2019-10-22 VITALS — SYSTOLIC BLOOD PRESSURE: 128 MMHG | DIASTOLIC BLOOD PRESSURE: 89 MMHG

## 2019-10-22 VITALS — SYSTOLIC BLOOD PRESSURE: 114 MMHG | DIASTOLIC BLOOD PRESSURE: 98 MMHG

## 2019-10-22 VITALS — SYSTOLIC BLOOD PRESSURE: 131 MMHG | DIASTOLIC BLOOD PRESSURE: 63 MMHG

## 2019-10-22 LAB
ANION GAP SERPL CALC-SCNC: 9.1 MMOL/L (ref 8–16)
BASOPHILS NFR BLD AUTO: 0.1 % (ref 0–2)
BUN SERPL-MCNC: 10 MG/DL (ref 7–18)
CALCIUM SERPL-MCNC: 8.1 MG/DL (ref 8.5–10.1)
CHLORIDE SERPL-SCNC: 107 MMOL/L (ref 98–107)
CO2 SERPL-SCNC: 28.3 MMOL/L (ref 21–32)
CREAT SERPL-MCNC: 0.8 MG/DL (ref 0.6–1.3)
EOSINOPHIL NFR BLD: 1.3 % (ref 0–7)
ERYTHROCYTE [DISTWIDTH] IN BLOOD BY AUTOMATED COUNT: 14.7 % (ref 11.5–14.5)
GLUCOSE SERPL-MCNC: 105 MG/DL (ref 74–106)
HCT VFR BLD CALC: 33.5 % (ref 36–48)
HGB BLD-MCNC: 10 G/DL (ref 12–16)
IMM GRANULOCYTES NFR BLD: 0.2 % (ref 0–5)
LYMPHOCYTES NFR BLD AUTO: 18.2 % (ref 15–50)
MAGNESIUM SERPL-MCNC: 1.9 MG/DL (ref 1.8–2.4)
MCH RBC QN AUTO: 29.4 PG (ref 26–34)
MCHC RBC AUTO-ENTMCNC: 29.9 G/DL (ref 31–37)
MCV RBC: 98.5 FL (ref 80–100)
MONOCYTES NFR BLD: 8.4 % (ref 2–11)
NEUTROPHILS NFR BLD AUTO: 71.8 % (ref 40–80)
OSMOLALITY SERPL CALC.SUM OF ELEC: 279 MOSM/KG (ref 275–300)
PHOSPHATE SERPL-MCNC: 4.2 MG/DL (ref 2.5–4.9)
PLATELET # BLD: 230 10X3/UL (ref 130–400)
PMV BLD AUTO: 11 FL (ref 7.4–10.4)
POTASSIUM SERPL-SCNC: 3.4 MMOL/L (ref 3.5–5.1)
RBC # BLD AUTO: 3.4 10X6/UL (ref 4–5.4)
SODIUM SERPL-SCNC: 141 MMOL/L (ref 136–145)
WBC # BLD AUTO: 10.2 10X3/UL (ref 4.8–10.8)

## 2019-10-22 NOTE — NUR
CALLED AND NOTIFIED MISTY ABOUT PT DROP IN O2SAT AND BP. HE STATES TO KEEP AND
EYE ON THE PT AT THIS TIME, AND HE WILL NOTIFY THE ADMITTING PHYSICIAN.

## 2019-10-22 NOTE — MORECARE
CASE MANAGEMENT DISCHARGE SUMMARY
 
 
PATIENT: STEPHANI SMITH                           UNIT: [id number]
ACCOUNT#: [id number]                       ADM DATE: 10/18/19
AGE: 54     : 61  SEX: F            ROOM/BED: D.2302    
AUTHOR: RUFUS BAXTER                             PHYSICIAN:                               
 
REFERRING PHYSICIAN: KENA APODACA MD               
DATE OF SERVICE: 10/22/19
Discharge Plan
 
 
Patient Name: STEPHANI SMITH
Facility: [facility]:[place]
Encounter #: [id number]
Medical Record #: [id number]
: 1965
Planned Disposition: 
Anticipated Discharge Date: 
 
Discharge Date: 
Expected LOS: 
Initial Reviewer: [email or web address]
Initial Review Date: 10/22/2019
Generated: 10/22/19  11:04 am 
  
 
 
 
 
 
 
 
Patient Name: STEPHANI SMITH
 
Encounter #: [id number]
Page [id number]
 
 
 
 
 
Electronically Signed by RUFUS BAXTER on 10/22/19 at 1004
 
 
 
 
 
 
**All edits/amendments must be made on the electronic document**
 
DICTATION DATE: 10/22/19 100     : JOSÉ MIGUEL  10/22/19 1004     
RPT#: [id number]                                DC DATE:        
                                               STATUS: ADM IN  
[facility]
 [place] [address]
***END OF REPORT***

## 2019-10-22 NOTE — MORECARE
CASE MANAGEMENT DISCHARGE SUMMARY
 
 
PATIENT: STEPHANI SMITH                           UNIT: [id number]
ACCOUNT#: [id number]                       ADM DATE: 10/18/19
AGE: 54     : 61  SEX: F            ROOM/BED: D.2302    
AUTHOR: SAHILDOC                             PHYSICIAN:                               
 
REFERRING PHYSICIAN: KENA APODACA MD               
DATE OF SERVICE: 10/22/19
Discharge Plan
 
 
Patient Name: STEPHANI SMITH
Facility: [facility]:[place]
Encounter #: [id number]
Medical Record #: [id number]
: 1965
Planned Disposition: 
Anticipated Discharge Date: 
 
Discharge Date: 
Expected LOS: 
Initial Reviewer: [email or web address]
Initial Review Date: 10/22/2019
Generated: 10/22/19  11:11 am 
Comments
 
DCP- Discharge Planning
 
Updated by [email or web address]: Joanne Roper on 10/22/19   9:05 am CT
Patient Name: STEPHANI SMITH                                     
Admission Status: ER   
Accout number: [id number]                              
Admission Date: 10-   
: 1965                                                        
Admission Diagnosis:   
Attending: KENA APODACA                                                
Current LOS:  4   
  
Anticipated DC Date:    
Planned Disposition:    
Primary Insurance: [facility] MEDICARE SOLUTIONS   
  
  
Discharge Planning Comments: CM MET WITH PATIENT AFTER OBTAINING VERBAL 
CONSENT. [facility] PLANS TO DISCHARGE TO HOME WITH DAUGHTER. [facility] HAS HH BUT 
DOESN'T KNOW THE NAME AND HAS AN AIDE THAT HELPS HER. [facility] ALSO 
SEES HER. CM WILL FOLLOW AND ASSIST AS NEEDED.   
  
 
  
  
  
  
  
: Joanne Roper
 DCPIA - Discharge Planning Initial Assessment
 
Updated by [id number]: Joanne Roper on 10/22/19  10:04 am
*  Is the patient Alert and Oriented?
Yes
*  PCP
[facility]
*  Pharmacy
[facility]
*  Preadmission Environment
Home with Family
*  Other Equipment
02, NEBS, CANE, WALKER, BC
*  List name and contact numbers for known caregivers / representatives who 
currently or will assist patient after discharge:
JACKI DAUGHTER [phone]
*  Verbal permission to speak to the caregivers and representatives has been 
obtained from the patient.
Yes
*  Please name any agencies selected above.
[facility]
*  Additional services required to return to the preadmission environment?
No
*  Can the patient safely return to the preadmission environment?
Yes
*  Has this patient been hospitalized within the prior 30 days at any 
hospital?
No
 
 
 
 
 
 
 
Last DP export: 10/22/19   9:04 
Patient Name: STEPHANI SMITH
 
Encounter #: [id number]
Page [id number]
 
 
 
 
 
Electronically Signed by RUFUS BAXTER on 10/22/19 at 1012
 
 
 
 
 
 
**All edits/amendments must be made on the electronic document**
 
DICTATION DATE: 10/22/19 1011     : JOSÉ MIGUEL  10/22/19 1011     
RPT#: [id number]                                DC DATE:        
                                               STATUS: ADM IN  
[facility]
 [place] [address]
***END OF REPORT***

## 2019-10-22 NOTE — NUR
REC'D REPORT AND RESUMED CAR, SLEEPING AROUSABLE TO VERBAL STIMULI, VSS,
DENIES PAIN, ASSESSMENT COMPLETED PER FLOWSHEET, REPOSITIONED TO BACK, CALL
LIGHT IN REACH, NO NEEDS AT THIS TIME

## 2019-10-22 NOTE — NUR
PATIENT RESTING IN BED WITH O2 SAT OF 73% WHEN I CHECKED HER VITALS. I WOKE
THE PATIENT AND INSTRUCTED HER TO TAKE DEEP BREATHS. THE PATIENT'S O2 WENT UP
TO 87%. I TURNED THE PATIENT'S O2 TO 3L AND THE PATIENT'S O2 WENT UP TO 93%.
I THEN CALLED RT TO HAVE THE PATIENT PLACED ON HER BIPAP. WILL CONTINUE TO
MONITOR.

## 2019-10-22 NOTE — CN
PATIENT NAME:STEPHANI WOLFE                                     MEDICAL RECORD: [id number]
: 61                                              LOCATION:D. D.1208
ADMIT DATE: 10/18/19                                       ACCOUNT: [id number]
CONSULTING PHYSICIAN:    ROMAN QUIÑONES MD              
                                               
REFERRING PHYSICIAN:     KENA APODACA MD               
 
 
DATE OF CONSULTATION:  10/19/2019
 
CONSULT REQUESTING PHYSICIAN:  Emily Temple MD
 
REASON FOR CONSULTATION:  Acute-on-chronic hypoxic hypercapnic respiratory
failure, respiratory acidosis, mental status changes.
 
HISTORY OF PRESENT ILLNESS:  Ms. Wolfe is a 54-year-old  female who was
brought into the ER, mental status changes and lethargic.  When the patient was
brought into the ICU on the BiPAP, the patient became more unresponsive and
lethargic and the patient was electively intubated overnight last night.  Now,
the history was taken mainly by reviewing the patient's chart and talking to the
nursing staff.
 
REVIEW OF SYSTEMS:  The detail is not obtainable.
 
PAST MEDICAL HISTORY:
1.  COPD.
2.  Anxiety.
3.  Depression.
4.  Morbid obesity.
5.  History of pneumonia.
6.  Hypernatremia.
7.  History of hyponatremia.
 
PERSONAL AND SOCIAL HISTORY:  The patient is still current every day smoker.
 
FAMILY HISTORY:  Significant for hypertension.
 
PHYSICAL EXAMINATION:
GENERAL:  Now, the patient is orally intubated and sedated.
VITAL SIGNS:  The blood pressure is 115/72, pulse is 63, respirations is 23,
temperature 98.8, and SPO2 is 97%.  She is on assist control mechanical
ventilation, tidal volume of 500, PEEP of 7.
HEENT:  Conjunctivae are pink.  Sclerae are not icteric.
NECK:  Supple, no JVD.
CHEST:  The chest excursion is minimal on both sides.  There is no wheeze, no
rales.
HEART:  Rhythm regular, normal sound, no murmur.
ABDOMEN:  Soft, bowel sounds present.  No hepatosplenomegaly.
RECTAL:  Deferred.
EXTREMITIES:  No cyanosis, no clubbing.  There is 1+ pedal edema.
CENTRAL NERVOUS SYSTEM:  The patient is orally intubated and sedated.
 
LABORATORY DATA:  CBC; the WBC is 12.1, hemoglobin 9.5, hematocrit 33, the
platelet count is 220.  Chemistry; sodium 135, potassium 4.2, bicarbonate is 38
and last night the bicarbonate was 41.2, BUN is 12, creatinine is 0.8.  The
proBNP is 1703.  ABG; the pH was 7.19, pCO2 118.8, the pO2 was 111.  The
bicarbonate is 45.7.
 
 
 
 
CONSULT REPORT                                 [id number]    STEPHANI WOLFE                   
 
 
IMPRESSION:
1.  Acute-on-chronic hypoxic hypercapnic respiratory failure, which is
multifactorial.
2.  Respiratory acidosis.
3.  Obesity hypoventilation syndrome.
4.  Suspect obstructive sleep apnea.
5.  Chronic obstructive pulmonary disease.
6.  Tobacco dependence syndrome.
7.  Mental status changes secondary to hypercapnia.
 
RECOMMENDATION:
1.  We will continue mechanical ventilation, change it to SIMV.
2.  Check the sputum culture.
3.  Check the ammonia level.
4.  Start on Diamox.
5.  Albuterol/ipratropium nebulizer.
6.  DVT and GI prophylaxis.
7.  We will try to wean the patient when more stable.
8.  Continue empiric antibiotic.  Discussed with RN and RT.
 
Dr. Temple, thank you for involving me in the care of Ms. Wolfe.
 
The critical care time is 45 minutes.
 
TRANSINT:[id number] Voice Confirmation ID: [id number] DOCUMENT ID: [id number]
                                           
                                           ROMAN QUIÑONES MD              
 
 
 
Electronically Signed by ROMAN QUIÑONES on 10/22/19 at 1443
 
 
 
 
 
 
 
 
 
 
 
 
 
 
 
 
CC:                                                             [id number]
DICTATION DATE: 10/19/19 1123     :     10/19/19 1231      ADM IN  
                                                                              
[facility]                                          
[address] [id number]

## 2019-10-22 NOTE — NUR
NO CHANGE IN ASSESSMENT. RESTING WO DISTRESS. NO C/O PAIN. RESP EVEN AND
UNLABORED AT 2L NC. CL IN REACH.

## 2019-10-22 NOTE — NUR
TRANSFERRED VIA BED TO [facility], [facility], RECEIVING PRIMARY NURSE AT BEDSIDE, CALL
LIGHT AT BEDSIDE

## 2019-10-22 NOTE — NUR
TRANSFERED FROM ICU TO ROOM 1208. ALERT AND ORIENTED. OBESE. WAS INCONT OF BM
AT ARRIVAL TO UNIT. REPOSITIONED UP IN BED AND CLEANED PATIENT UP. CL IN
REACH.

## 2019-10-22 NOTE — NUR
PT ATTEMPTING TO TAKE BIPAP OFF, I EXPLAINED TO HER THAT SHE NEEDS TO KEEP IT
ON. SHE ARGUED WITH ME AND SAID "I WILL TAKE IT OFF IF YOU DON'T, I AM NOT
WEARING IT ANYMORE". I GOT NABILA, RT. NABILA INSTRUCTED HER TO LEAVE IT ON FOR
ANOTHER HOUR.

## 2019-10-22 NOTE — NUR
MEDS GIVEN PER MAR. I EXPLAINED TO PT THAT PHYSICAL THERAPY WILL SEE HER TODAY
AND GET HER OUT OF BED, SHE SAID "I WONT WALK ON MY LEG WITHOUT PAIN MEDICINE
SO YOU WILL NEED TO TALK TO THE DOCTOR". I EXPLAINED THE BENEFITS OF GETTING
OUT OF BED AND ROM EXERCISES. SHE SHOOK HER HEAD NO.

## 2019-10-22 NOTE — NUR
PT REQUESTING COFFEE WITH CREAM AND SUGAR. VSS, NO SIGNS OF ACUTE DISTRESS
NOTED. CALL LIGHT IN REACH, WILL CONTINUE TO MONITOR.

## 2019-10-23 VITALS — DIASTOLIC BLOOD PRESSURE: 82 MMHG | SYSTOLIC BLOOD PRESSURE: 90 MMHG

## 2019-10-23 VITALS — SYSTOLIC BLOOD PRESSURE: 112 MMHG | DIASTOLIC BLOOD PRESSURE: 61 MMHG

## 2019-10-23 VITALS — DIASTOLIC BLOOD PRESSURE: 58 MMHG | SYSTOLIC BLOOD PRESSURE: 117 MMHG

## 2019-10-23 VITALS — SYSTOLIC BLOOD PRESSURE: 159 MMHG | DIASTOLIC BLOOD PRESSURE: 86 MMHG

## 2019-10-23 VITALS — SYSTOLIC BLOOD PRESSURE: 103 MMHG | DIASTOLIC BLOOD PRESSURE: 48 MMHG

## 2019-10-23 VITALS — DIASTOLIC BLOOD PRESSURE: 49 MMHG | SYSTOLIC BLOOD PRESSURE: 94 MMHG

## 2019-10-23 LAB
ANION GAP SERPL CALC-SCNC: 10 MMOL/L (ref 8–16)
BASOPHILS NFR BLD AUTO: 0.1 % (ref 0–2)
BUN SERPL-MCNC: 11 MG/DL (ref 7–18)
CALCIUM SERPL-MCNC: 8.7 MG/DL (ref 8.5–10.1)
CHLORIDE SERPL-SCNC: 108 MMOL/L (ref 98–107)
CO2 SERPL-SCNC: 28.5 MMOL/L (ref 21–32)
CREAT SERPL-MCNC: 0.7 MG/DL (ref 0.6–1.3)
EOSINOPHIL NFR BLD: 1.7 % (ref 0–7)
ERYTHROCYTE [DISTWIDTH] IN BLOOD BY AUTOMATED COUNT: 14.7 % (ref 11.5–14.5)
GLUCOSE SERPL-MCNC: 122 MG/DL (ref 74–106)
HCT VFR BLD CALC: 36.8 % (ref 36–48)
HGB BLD-MCNC: 10.6 G/DL (ref 12–16)
IMM GRANULOCYTES NFR BLD: 0.2 % (ref 0–5)
LYMPHOCYTES NFR BLD AUTO: 20.1 % (ref 15–50)
MAGNESIUM SERPL-MCNC: 2.1 MG/DL (ref 1.8–2.4)
MCH RBC QN AUTO: 29.6 PG (ref 26–34)
MCHC RBC AUTO-ENTMCNC: 28.8 G/DL (ref 31–37)
MCV RBC: 102.8 FL (ref 80–100)
MONOCYTES NFR BLD: 8.6 % (ref 2–11)
NEUTROPHILS NFR BLD AUTO: 69.3 % (ref 40–80)
OSMOLALITY SERPL CALC.SUM OF ELEC: 282 MOSM/KG (ref 275–300)
PHOSPHATE SERPL-MCNC: 5.5 MG/DL (ref 2.5–4.9)
PLATELET # BLD: 230 10X3/UL (ref 130–400)
PMV BLD AUTO: 10.6 FL (ref 7.4–10.4)
POTASSIUM SERPL-SCNC: 4.5 MMOL/L (ref 3.5–5.1)
RBC # BLD AUTO: 3.58 10X6/UL (ref 4–5.4)
SODIUM SERPL-SCNC: 142 MMOL/L (ref 136–145)
WBC # BLD AUTO: 10.9 10X3/UL (ref 4.8–10.8)

## 2019-10-23 NOTE — NUR
PIV TO LEFT UPPER ARM INFILTRATED, PIV REMOVED WITH CATHETER TIP INTACT. 20G
PIV INSERTED X1 ATTEMPT TO PT RIGHT UPPER ARM. PT TOLERATED WELL. DENIES ANY
FURTHER NEEDS AT THIS TIME, WILL CONT TO FOLLOW POC

## 2019-10-23 NOTE — NUR
NURSE WAS TOLD IN REPORT THAT PT WAS BEDFAST AND TO USE A BEDPAN WITH
TOILETING. PHYSICAL THERAPY HERE AND ASSESSED PT AND PER PHYSICAL THERAPY, PT
IS A STAND BY ASSIST WITH A WALKER.

## 2019-10-23 NOTE — NUR
PT RESTING IN BED, BIPAP ON AS ORDERED. SHIFT ASSESSMENT PERFORMED. DENIES ANY
NEEDS AT THIS TIME. WILL CONT TO FOLLOW POC

## 2019-10-23 NOTE — NUR
Nutrtion Follow-up:
Pt was asleep at time of my visit. She did not awaken to me calling her name.
she was lightly snoring. NC in place.
Diet: Regular
PO intake: 100% x last 2 meals recorded; lunch tray still in room ~75% eaten
Significant meds: unasyn, levaquin. Labs noted: PO4 5.5, Glu 122
Nursing skin assessment reviewed.
Wt: 268# (10/22/19- bedscale); Admit wt: 256# (10/19/19- bedscale)
Last BM 10/22/19
Continue regular diet. Monitor PO intake and wt trend. RD following.

## 2019-10-24 VITALS — SYSTOLIC BLOOD PRESSURE: 106 MMHG | DIASTOLIC BLOOD PRESSURE: 39 MMHG

## 2019-10-24 VITALS — SYSTOLIC BLOOD PRESSURE: 97 MMHG | DIASTOLIC BLOOD PRESSURE: 56 MMHG

## 2019-10-24 VITALS — SYSTOLIC BLOOD PRESSURE: 99 MMHG | DIASTOLIC BLOOD PRESSURE: 48 MMHG

## 2019-10-24 VITALS — SYSTOLIC BLOOD PRESSURE: 113 MMHG | DIASTOLIC BLOOD PRESSURE: 70 MMHG

## 2019-10-24 LAB
ANION GAP SERPL CALC-SCNC: 8.2 MMOL/L (ref 8–16)
BASOPHILS NFR BLD AUTO: 0.1 % (ref 0–2)
BUN SERPL-MCNC: 12 MG/DL (ref 7–18)
CALCIUM SERPL-MCNC: 8.9 MG/DL (ref 8.5–10.1)
CHLORIDE SERPL-SCNC: 108 MMOL/L (ref 98–107)
CO2 SERPL-SCNC: 31 MMOL/L (ref 21–32)
CREAT SERPL-MCNC: 0.7 MG/DL (ref 0.6–1.3)
EOSINOPHIL NFR BLD: 2.4 % (ref 0–7)
ERYTHROCYTE [DISTWIDTH] IN BLOOD BY AUTOMATED COUNT: 14.6 % (ref 11.5–14.5)
GLUCOSE SERPL-MCNC: 91 MG/DL (ref 74–106)
HCT VFR BLD CALC: 35.2 % (ref 36–48)
HGB BLD-MCNC: 10 G/DL (ref 12–16)
IMM GRANULOCYTES NFR BLD: 0.2 % (ref 0–5)
LYMPHOCYTES NFR BLD AUTO: 18.1 % (ref 15–50)
MCH RBC QN AUTO: 29.3 PG (ref 26–34)
MCHC RBC AUTO-ENTMCNC: 28.4 G/DL (ref 31–37)
MCV RBC: 103.2 FL (ref 80–100)
MONOCYTES NFR BLD: 9.2 % (ref 2–11)
NEUTROPHILS NFR BLD AUTO: 70 % (ref 40–80)
OSMOLALITY SERPL CALC.SUM OF ELEC: 284 MOSM/KG (ref 275–300)
PLATELET # BLD: 233 10X3/UL (ref 130–400)
PMV BLD AUTO: 11 FL (ref 7.4–10.4)
POTASSIUM SERPL-SCNC: 4.2 MMOL/L (ref 3.5–5.1)
RBC # BLD AUTO: 3.41 10X6/UL (ref 4–5.4)
SODIUM SERPL-SCNC: 143 MMOL/L (ref 136–145)
WBC # BLD AUTO: 9.2 10X3/UL (ref 4.8–10.8)

## 2019-10-24 NOTE — MORECARE
CASE MANAGEMENT DISCHARGE SUMMARY
 
 
PATIENT: STEPHANI SMITH                           UNIT: [id number]
ACCOUNT#: [id number]                       ADM DATE: 10/18/19
AGE: 54     : 61  SEX: F            ROOM/BED: D.1208    
AUTHOR: RUFUS BAXTER                             PHYSICIAN:                               
 
REFERRING PHYSICIAN: KENA APODACA MD               
DATE OF SERVICE: 10/24/19
Discharge Plan
 
 
Patient Name: STEPHANI SMITH
Facility: [facility]:[place]
Encounter #: [id number]
Medical Record #: [id number]
: 1965
Planned Disposition: 
Anticipated Discharge Date: 
 
Discharge Date: 10/24/2019
Expected LOS: 
Initial Reviewer: [email or web address]
Initial Review Date: 10/22/2019
Generated: 10/24/19   8:38 pm 
Comments
 
DCP- Discharge Planning
 
Updated by [id number]: Mckayla Jewell on 10/24/19   6:33 pm CT
Patient Name:  STEPHANI SMITH   
Encounter No:  [id number]   
:  1965   
Primary Insurance:  [facility] MEDICARE SOLUTIONS  
Anticipated DC Date:    
Planned Disposition:    
External Planned Provider: : D/C IMM SIGNED DENIES ANY NEEDS  
  
  
DCP follow-up note: Patient and family in agreement with discharge plan. No 
changes to plan. Case management will follow and assist as needed.  
Mckayla Jewell
DCP- Discharge Planning
 
Updated by [id number]: Joanne Roper on 10/22/19   9:05 am CT
Patient Name: STEPHANI SIMTH                                     
Admission Status: ER   
Accout number: [id number]                              
Admission Date: 10-   
 
: 1965                                                        
Admission Diagnosis:   
Attending: KENA APODACA                                                
Current LOS:  4   
  
Anticipated DC Date:    
Planned Disposition:    
Primary Insurance: [employer] MEDICARE SOLUTIONS   
  
  
Discharge Planning Comments: CM MET WITH PATIENT AFTER OBTAINING VERBAL 
CONSENT. [facility] PLANS TO DISCHARGE TO HOME WITH DAUGHTER.  HAS HH BUT 
DOESN'T KNOW THE NAME AND HAS AN AIDE THAT HELPS HER. [facility] ALSO 
SEES HER. CM WILL FOLLOW AND ASSIST AS NEEDED.   
  
  
  
  
  
  
: Joanne Roper
 DCPIA - Discharge Planning Initial Assessment
 
Updated by [id number]: Joanen Roper on 10/22/19  10:04 am
*  Is the patient Alert and Oriented?
Yes
*  PCP
[facility]
*  Pharmacy
[facility]
*  Preadmission Environment
Home with Family
*  Other Equipment
02, NEBS, CANE, WALKER, BC
*  List name and contact numbers for known caregivers / representatives who 
currently or will assist patient after discharge:
JACKI DAUGHTER [phone]
*  Verbal permission to speak to the caregivers and representatives has been 
obtained from the patient.
Yes
*  Please name any agencies selected above.
[facility]
*  Additional services required to return to the preadmission environment?
No
*  Can the patient safely return to the preadmission environment?
Yes
*  Has this patient been hospitalized within the prior 30 days at any 
hospital?
No
 
 
 
 
 
 
Coverage Notice
 
Reviewer: [email or web address] Owen Jewell
 
Notice Issued Date-Time: 10/24/2019  15:00
Notice Type: IM Discharge Notice
 
Notice Delivered To: Patient
Relationship to Patient: Self
Representative Name: 
 
Delivery Method: HAND - Hand Delivered
[place] Days:
Prior Verbal Notification: 
 
Recipient Understood Notice: Yes
Recipient Signature: Yes
Med Rec Note Co-signed by Attending:
 
Coverage Notice Comment:  
 
Last DP export: 10/22/19   9:12 
Patient Name: STEPHANI SMITH
Encounter #: [id number]
Page [id number]
 
 
 
 
 
Electronically Signed by RUFUS BAXTER on 10/24/19 at 1938
 
 
 
 
 
 
**All edits/amendments must be made on the electronic document**
 
DICTATION DATE: 10/24/19 1938     : JOSÉ MIGUEL  10/24/19 1938     
RPT#: [id number]                                DC DATE:10/24/19
                                               STATUS: DIS IN  
[facility]
[address] [place] [address]
***END OF REPORT***

## 2019-10-24 NOTE — NUR
PT RESTING IN BED, SHIFT ASSESSMENT PERFORMED, VSS AND WNL. DENIES ANY NEEDS
AT THIS TIME, O2 AT 3L VIA NC NOTED. WILL CONT TO FOLLOW POC

## 2019-10-24 NOTE — NUR
DISCHARGE INSTRUCTIONS REVIEWED WITH PT AND ALL QUESTIONS ANSWERED. PIV
REMOVED WITH CATHETER TIP INTACT. PT HOME MEDICATIONS FROM PHARMACY GIVEN BACK
TO PT WILL ALL MEDICATION COUNTS CORRECT. PT TAKEN TO THE FRONT VIA WHEELCHAIR
WHERE SHE LEFT WITH DAUGHTER

## 2024-10-07 NOTE — NUR
RESP THERAPIST IN PT ROOM. PLACED PT ON CPAP. PT 02 SAT 96 AT THIS TIME. This patient was referred for tympanometric testing by LADAN Barakat due to eustachian tube dysfunction.     Tympanometry revealed normal middle ear peak pressure and compliance, bilaterally.    The results were reviewed with the patient and ordering provider.     Recommendations for follow up will be made pending ordering provider consult.    Hector Zamorano/[facility]RE[facility]  [place] Lic [id number]  Electronically signed by Hector Zamorano on 10/7/2024 at 3:10 PM       Rehab center